# Patient Record
Sex: MALE | URBAN - NONMETROPOLITAN AREA
[De-identification: names, ages, dates, MRNs, and addresses within clinical notes are randomized per-mention and may not be internally consistent; named-entity substitution may affect disease eponyms.]

---

## 2022-09-23 ENCOUNTER — APPOINTMENT (OUTPATIENT)
Dept: URBAN - NONMETROPOLITAN AREA CLINIC 55 | Age: 47
Setting detail: DERMATOLOGY
End: 2022-09-23

## 2022-09-23 DIAGNOSIS — L81.7 PIGMENTED PURPURIC DERMATOSIS: ICD-10-CM

## 2022-09-23 DIAGNOSIS — I87.2 VENOUS INSUFFICIENCY (CHRONIC) (PERIPHERAL): ICD-10-CM

## 2022-09-23 PROCEDURE — OTHER PRESCRIPTION MEDICATION MANAGEMENT: OTHER

## 2022-09-23 PROCEDURE — OTHER PRESCRIPTION: OTHER

## 2022-09-23 PROCEDURE — OTHER COUNSELING: OTHER

## 2022-09-23 PROCEDURE — OTHER MIPS QUALITY: OTHER

## 2022-09-23 PROCEDURE — 99203 OFFICE O/P NEW LOW 30 MIN: CPT

## 2022-09-23 RX ORDER — TRIAMCINOLONE ACETONIDE 1 MG/G
CREAM TOPICAL BID
Qty: 454 | Refills: 3 | Status: ERX | COMMUNITY
Start: 2022-09-23

## 2022-09-23 ASSESSMENT — LOCATION DETAILED DESCRIPTION DERM
LOCATION DETAILED: LEFT PROXIMAL PRETIBIAL REGION
LOCATION DETAILED: RIGHT DISTAL PRETIBIAL REGION

## 2022-09-23 ASSESSMENT — LOCATION SIMPLE DESCRIPTION DERM
LOCATION SIMPLE: LEFT PRETIBIAL REGION
LOCATION SIMPLE: RIGHT PRETIBIAL REGION

## 2022-09-23 ASSESSMENT — LOCATION ZONE DERM: LOCATION ZONE: LEG

## 2022-09-23 NOTE — PROCEDURE: PRESCRIPTION MEDICATION MANAGEMENT
Detail Level: Zone
Initiate Treatment: Triamcinolone Cream bid x two weeks \\nElevate legs in the evening
Render In Strict Bullet Format?: No

## 2022-09-23 NOTE — HPI: INFECTION (IMPETIGO)
How Severe Is It?: moderate
Is This A New Presentation, Or A Follow-Up?: Infection
Additional History: Was given antibiotics at urgent care